# Patient Record
Sex: FEMALE | Race: WHITE | NOT HISPANIC OR LATINO | Employment: OTHER | URBAN - METROPOLITAN AREA
[De-identification: names, ages, dates, MRNs, and addresses within clinical notes are randomized per-mention and may not be internally consistent; named-entity substitution may affect disease eponyms.]

---

## 2019-12-12 ENCOUNTER — OFFICE VISIT (OUTPATIENT)
Dept: DERMATOLOGY | Facility: IMAGING CENTER | Age: 79
End: 2019-12-12
Payer: MEDICARE

## 2019-12-12 VITALS — WEIGHT: 134 LBS | BODY MASS INDEX: 23.74 KG/M2 | TEMPERATURE: 97 F | HEIGHT: 63 IN

## 2019-12-12 DIAGNOSIS — L81.4 LENTIGINES: ICD-10-CM

## 2019-12-12 DIAGNOSIS — L73.8 SEBACEOUS HYPERPLASIA: ICD-10-CM

## 2019-12-12 DIAGNOSIS — L82.1 SEBORRHEIC KERATOSES: ICD-10-CM

## 2019-12-12 DIAGNOSIS — L57.0 ACTINIC KERATOSES: ICD-10-CM

## 2019-12-12 DIAGNOSIS — D18.01 CHERRY ANGIOMA: ICD-10-CM

## 2019-12-12 DIAGNOSIS — Z12.83 SKIN CANCER SCREENING: ICD-10-CM

## 2019-12-12 DIAGNOSIS — R23.8 VENOUS LAKE OF LIP: ICD-10-CM

## 2019-12-12 PROCEDURE — 17003 DESTRUCT PREMALG LES 2-14: CPT | Performed by: DERMATOLOGY

## 2019-12-12 PROCEDURE — 99203 OFFICE O/P NEW LOW 30 MIN: CPT | Mod: 25 | Performed by: DERMATOLOGY

## 2019-12-12 PROCEDURE — 17000 DESTRUCT PREMALG LESION: CPT | Performed by: DERMATOLOGY

## 2019-12-12 RX ORDER — MECLIZINE HYDROCHLORIDE 25 MG/1
25 TABLET ORAL 3 TIMES DAILY PRN
COMMUNITY
End: 2021-03-10

## 2019-12-12 RX ORDER — METOPROLOL SUCCINATE 100 MG/1
100 TABLET, EXTENDED RELEASE ORAL EVERY MORNING
COMMUNITY

## 2019-12-12 RX ORDER — DULOXETIN HYDROCHLORIDE 60 MG/1
60 CAPSULE, DELAYED RELEASE ORAL EVERY MORNING
COMMUNITY

## 2019-12-12 NOTE — PROGRESS NOTES
DERMATOLOGY NOTE  NEW VISIT     12/12/19  Shy Barnett  1940  MRN: [id number]     Chief complaint: Establish Care       Shy Barnett is a 79 y.o. female who presents for evaluation of multiple skin lesions to check for skin cancer    HPI/location: lip two spots,   Time present: 5 + years  Painful lesion: Yes  Itching lesion: No  Enlarging lesion: No  Anything make it better or worse? no    History of skin cancer: No  History of precancers/actinic keratoses: No  History of biopsies:Yes, Details: moles removed   History of blistering/severe sunburns:Yes, Details: child  Family history of skin cancer:No  Family history of atypical moles:No        History reviewed. No pertinent past medical history.     History reviewed. No pertinent family history.     Social History     Socioeconomic History   • Marital status:      Spouse name: Not on file   • Number of children: Not on file   • Years of education: Not on file   • Highest education level: Not on file   Occupational History   • Not on file   Social Needs   • Financial resource strain: Not on file   • Food insecurity:     Worry: Not on file     Inability: Not on file   • Transportation needs:     Medical: Not on file     Non-medical: Not on file   Tobacco Use   • Smoking status: Never Smoker   • Smokeless tobacco: Never Used   Substance and Sexual Activity   • Alcohol use: Yes     Alcohol/week: 0.6 oz     Types: 1 Glasses of wine per week   • Drug use: Not on file   • Sexual activity: Not on file   Lifestyle   • Physical activity:     Days per week: Not on file     Minutes per session: Not on file   • Stress: Not on file   Relationships   • Social connections:     Talks on phone: Not on file     Gets together: Not on file     Attends Rastafari service: Not on file     Active member of club or organization: Not on file     Attends meetings of clubs or organizations: Not on file     Relationship status: Not on file   • Intimate partner violence:     Fear of current or  "ex partner: Not on file     Emotionally abused: Not on file     Physically abused: Not on file     Forced sexual activity: Not on file   Other Topics Concern   • Not on file   Social History Narrative   • Not on file        No Known Allergies     MEDICATIONS:  Medications relevant to specialty reviewed.    Current Outpatient Medications:   •  meclizine (ANTIVERT) 25 MG Tab, Take 25 mg by mouth 3 times a day as needed., Disp: , Rfl:   •  metoprolol SR (TOPROL XL) 25 MG TABLET SR 24 HR, Take 25 mg by mouth every day., Disp: , Rfl:   •  DULoxetine (CYMBALTA) 60 MG Cap DR Particles delayed-release capsule, Take 60 mg by mouth every day., Disp: , Rfl:      REVIEW OF SYSTEMS:   Positive for skin (see HPI)  Negative for fevers and chills  All other systems reviewed and are negative.    EXAM:  Temp 36.1 °C (97 °F)   Ht 1.6 m (5' 3\")   Wt 60.8 kg (134 lb)   BMI 23.74 kg/m²   Constitutional: Well-developed, well-nourished, and in no distress.   HENT:   Head: normocephalic and atraumatic.  Right Ear: External ear normal.   Left Ear: External ear normal.   Nose: Nose normal.   Mouth/Throat: Oropharynx is clear and moist.   Eyes: Conjunctivae and lids are normal.   Neck: Normal range of motion. Neck supple.   Pulmonary/Chest: Effort normal.   Neurological: Alert and oriented.    A total body skin exam was performed including the scalp, hair, face, eyelids, nose, lips, oral cavity, ears, neck, back, buttocks, chest, abdomen, bilateral upper and lower extremities including hands, feet, digits and nails.  The genital exam was deferred to her gynecologist.  Notable findings on exam today listed below. Remaining above-listed examined areas within normal limits / negative for rashes or lesions.     -Lower lip w/ 2 violaceous papules   -foreheadx4,  bilateral forearms x2 and chest x1 with pink gritty papules  -face with scattered yellow-white papules with telangiectasias and central dell  -sun exposed skin of trunk and b/l upper and " lower extremities with scattered clinically benign light brown reticulated macules all of which were morphologically similar and none of which were suspicious for skin cancer today on exam  -trunk and extremities with cherry red macules and papules  -trunk and extremities with scattered brown-grey, waxy, hyperkeratotic papules and plaques       IMPRESSION / PLAN:    1. Actinic keratoses  - Discussed precancerous nature of the condition and the relationship to past and current ultraviolet light exposure.  - Discussed need for safe sun practices and daily broad spectrum sun protection.   - Discussed treatment options including topical therapies, cryotherapy and photodynamic therapy.   - After discussing treatment options, patient wishes to pursue cryotherapy. Patient was advised that if actinic keratoses persist at one month after treatment, should come in for further evaluation and possible biopsy.     Procedure Note: Informed verbal consent including risk of redness, swelling, pain, blistering, scar, bleeding, infection and need for possible further treatment was obtained. The location(s) was/were identified by the patient and the physician. The lesion(s) was/were treated with cryotherapy for a total of 1 freeze/thaw cycles with the open spray technique. The patient tolerated the procedure well.  Wound care was discussed.   Location: as noted in exam  Total # of lesions treated:  7    2. Venous lake of lip  -nature of the diagnosis was discussed in detail  -clinically benign today on exam, reassurance was given  -continue to monitor for any changes, pt to call if any changes occur    3. Sebaceous hyperplasia  -nature of the diagnosis was discussed in detail  -clinically benign today on exam, reassurance was given  -continue to monitor for any changes, pt to call if any changes occur    4. Lentigines  - Discussed benign nature of lesions, related to sun exposure  - Discussed sun protection    5. Seborrheic  keratoses  -nature of the diagnosis was discussed in detail  -clinically benign today on exam, reassurance was given  -continue to monitor for any changes, pt to call if any changes occur    6. Cherry angioma  -nature of the diagnosis was discussed in detail  -clinically benign today on exam, reassurance was given  -continue to monitor for any changes, pt to call if any changes occur    7. Skin cancer screening  Skin cancer education  - discussed importance of sun protective clothing, eyewear  - discussed importance of daily use of broad spectrum sunscreen with SPF 30 or greater, as well as need for reapplication ~every 2 hours when exposed to UVR  - discussed importance of regular self-exams, ideally once per month, every 12 months exams in clinic  - ABCDE's of melanoma discussed  - patient to bring any new or concerning lesions to my attention     Return to clinic in: Return in about 1 year (around 12/12/2020) for lisa. and as needed for any new or changing skin lesions.    Shobha Thomas M.D.

## 2020-02-07 ENCOUNTER — HOSPITAL ENCOUNTER (OUTPATIENT)
Dept: LAB | Facility: MEDICAL CENTER | Age: 80
End: 2020-02-07
Attending: NURSE PRACTITIONER
Payer: MEDICARE

## 2020-02-07 PROCEDURE — 36415 COLL VENOUS BLD VENIPUNCTURE: CPT

## 2020-02-07 PROCEDURE — 86480 TB TEST CELL IMMUN MEASURE: CPT | Mod: GY

## 2020-02-10 LAB
GAMMA INTERFERON BACKGROUND BLD IA-ACNC: 0.05 IU/ML
M TB IFN-G BLD-IMP: NEGATIVE
M TB IFN-G CD4+ BCKGRND COR BLD-ACNC: 0 IU/ML
MITOGEN IGNF BCKGRD COR BLD-ACNC: >10 IU/ML
QFT TB2 - NIL TBQ2: 0 IU/ML

## 2020-03-02 ENCOUNTER — OFFICE VISIT (OUTPATIENT)
Dept: NEUROLOGY | Facility: MEDICAL CENTER | Age: 80
End: 2020-03-02
Payer: MEDICARE

## 2020-03-02 VITALS
TEMPERATURE: 97.7 F | HEIGHT: 63 IN | WEIGHT: 139.55 LBS | HEART RATE: 118 BPM | BODY MASS INDEX: 24.73 KG/M2 | DIASTOLIC BLOOD PRESSURE: 58 MMHG | SYSTOLIC BLOOD PRESSURE: 100 MMHG | RESPIRATION RATE: 14 BRPM | OXYGEN SATURATION: 96 %

## 2020-03-02 DIAGNOSIS — R51.9 CHRONIC INTRACTABLE HEADACHE, UNSPECIFIED HEADACHE TYPE: ICD-10-CM

## 2020-03-02 DIAGNOSIS — R41.3 MEMORY LOSS, SHORT TERM: ICD-10-CM

## 2020-03-02 DIAGNOSIS — R42 DIZZINESS AND GIDDINESS: ICD-10-CM

## 2020-03-02 DIAGNOSIS — G89.29 CHRONIC INTRACTABLE HEADACHE, UNSPECIFIED HEADACHE TYPE: ICD-10-CM

## 2020-03-02 PROCEDURE — 99205 OFFICE O/P NEW HI 60 MIN: CPT

## 2020-03-02 RX ORDER — BACLOFEN 20 MG
500 TABLET ORAL DAILY
Qty: 90 TAB | Refills: 3 | Status: SHIPPED | OUTPATIENT
Start: 2020-03-02 | End: 2021-05-24

## 2020-03-02 RX ORDER — POTASSIUM CHLORIDE 750 MG/1
10 TABLET, EXTENDED RELEASE ORAL 2 TIMES DAILY
COMMUNITY
Start: 2020-02-26

## 2020-03-02 RX ORDER — MEMANTINE HYDROCHLORIDE 5 MG/1
5 TABLET ORAL 2 TIMES DAILY
Qty: 90 TAB | Refills: 3 | Status: SHIPPED | OUTPATIENT
Start: 2020-03-02 | End: 2021-05-24

## 2020-03-02 RX ORDER — HYDROCHLOROTHIAZIDE 12.5 MG/1
12.5 TABLET ORAL DAILY
COMMUNITY
End: 2021-05-24

## 2020-03-02 ASSESSMENT — PATIENT HEALTH QUESTIONNAIRE - PHQ9: CLINICAL INTERPRETATION OF PHQ2 SCORE: 0

## 2020-03-03 NOTE — PROGRESS NOTES
CC:   Headaches and dizziness  States it comes and goes when she is stressed  Has to lay down for awhile   Memory loss      HPI:  One per week had a pounding headaches and was taking Fioricet daily for more than a year, recently was taken off Fioricet.  Now she takes OTC medications for her   Migraines in high school with visual aura and bright lights bother her gets nauseous and does not vomit.  < 15 tablets per month   Current headaches are 2-3 per week.  The dizziness comes together with the headaches and sometimes it is unrelated.  Feels nauseous do not throw up and lightheaded she also describes palpitations.  No ringing and position provokes it sometimes.  She used to live in [facility]   Dizziness sometimes comes once per month last event was within last month.  /58   Laying down and drinking water would help  Depression she is on cymbalta  She also has memory loss  She is now in assisted living facility and is there with her .  Sometimes has neck pain   Headaches 5/10takes aspirin and tylenol during the last week did not take any medications.  Drinking alcohol 1-2 glasses wine per day     FH  Dad had dementia   Mpm had migraines   Sister and brother are OK  Daughter has adrenal insufficiency     PCP is checking labs  No LOC   No strokes or TIA    100/58 blood pressure today     ROS:   Constitutional: No fevers or chills.  Eyes: No blurry vision or eye pain.  ENT: No dysphagia or hearing loss.  Respiratory: No cough or shortness of breath.  Cardiovascular: No chest pain or palpitations.  GI: No nausea, vomiting, or diarrhea.  : No urinary incontinence or dysuria.  Musculoskeletal: No joint swelling or arthralgias.  Skin: No skin rashes.  Neuro:as above   Endocrine: No heat or cold intolerance. No polydipsia or polyuria.  Psych: No depression or anxiety.  Heme/Lymph: No easy bruising or swollen lymph nodes.      Past Medical History:   No past medical history on file.  Depression   Anxiety    Hypertension   Migraines     Past Surgical History:   No past surgical history on file.    Social History:   Social History     Socioeconomic History   • Marital status:      Spouse name: Not on file   • Number of children: Not on file   • Years of education: Not on file   • Highest education level: Not on file   Occupational History   • Not on file   Social Needs   • Financial resource strain: Not on file   • Food insecurity     Worry: Not on file     Inability: Not on file   • Transportation needs     Medical: Not on file     Non-medical: Not on file   Tobacco Use   • Smoking status: Never Smoker   • Smokeless tobacco: Never Used   Substance and Sexual Activity   • Alcohol use: Yes     Alcohol/week: 0.6 oz     Types: 1 Glasses of wine per week   • Drug use: Not on file   • Sexual activity: Not on file   Lifestyle   • Physical activity     Days per week: Not on file     Minutes per session: Not on file   • Stress: Not on file   Relationships   • Social connections     Talks on phone: Not on file     Gets together: Not on file     Attends Rastafari service: Not on file     Active member of club or organization: Not on file     Attends meetings of clubs or organizations: Not on file     Relationship status: Not on file   • Intimate partner violence     Fear of current or ex partner: Not on file     Emotionally abused: Not on file     Physically abused: Not on file     Forced sexual activity: Not on file   Other Topics Concern   • Not on file   Social History Narrative   • Not on file     Family History:  No family history on file.    Allergies:   No Known Allergies    Current Outpatient Medications on File Prior to Visit   Medication Sig Dispense Refill   • potassium chloride SA (K-DUR) 10 MEQ Tab CR      • hydroCHLOROthiazide (HYDRODIURIL) 25 MG Tab Take 25 mg by mouth as needed.     • metoprolol SR (TOPROL XL) 25 MG TABLET SR 24 HR Take 25 mg by mouth every day.     • meclizine (ANTIVERT) 25 MG Tab Take 25  "mg by mouth 3 times a day as needed.     • DULoxetine (CYMBALTA) 60 MG Cap DR Particles delayed-release capsule Take 60 mg by mouth every day.       No current facility-administered medications on file prior to visit.      Physical Exam:     Encounter Vitals  Standard Vitals  Vitals  Blood Pressure : 100/58  Temperature: 36.5 °C (97.7 °F)  Temp src: Temporal  Pulse: (!) 118  Respiration: 14  Pulse Oximetry: 96 %  Height: 160 cm (5' 3\")  Weight: 63.3 kg (139 lb 8.8 oz)  Encounter Vitals  Temperature: 36.5 °C (97.7 °F)  Temp src: Temporal  Blood Pressure : 100/58  Pulse: (!) 118  Respiration: 14  Pulse Oximetry: 96 %  Weight: 63.3 kg (139 lb 8.8 oz)  Height: 160 cm (5' 3\")  BMI (Calculated): 24.72  Pulmonary-Specific Vitals     Durable Medical Equipment-Specific Vitals         Constitutional: Well-developed, well-nourished, good hygiene. Appears stated age.  Cardiovascular: RRR, with no murmurs, rubs or gallops. No carotid bruits.   Respiratory: Lungs CTA B/L, no W/R/R.   Abdomen: Soft Non-tender to Palpation. Non-distended.  Extremities: No peripheral edema, pedal pulses intact.   Skin: Warm, dry, intact. No rashes observed.  Eyes: Sclera anicteric   Funduscopic: Optic discs flat with no evidence of papilledema or pallor.   Neurologic:   Mental Status: Awake, alert, oriented x 3.   Speech: Fluent with normal prosody.   Memory: Able to recall recent and remote events accurately.    Concentration: Attentive. Able to focus on history and follow multi-step commands.              Fund of Knowledge: Appropriate   Cranial Nerves:    CN II: PERRL. No afferent pupillary defect.    CN III, IV, VI: Good eye closure, EOMI.     CN V: Facial sensation intact and symmetric.     CN VII: No facial asymmetry.     CN VIII: Hearing intact.     CN IX and X: Palate elevates symmetrically. Normal gag reflex.    CN XI: Symmetric shoulder shrug.     CN XII: Tongue midline.    Sensory: Intact light touch, vibration and temperature. "    Coordination: No evidence of past-pointing on finger to nose testing, no dysdiadochokinesia. Heel to shin intact.             DTR's: 2+ throughout without clonus.    Babinski: Toes downgoing bilaterally.   Romberg: Negative.   Movements: No tremors observed.   Musculoskeletal:    Strength: 5/5 in upper and lower extremities bilaterally.   Gait: Steady, narrow based.    Tone: Normal bulk and tone.   Joints: No swelling.   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 Folate and tsh labs   memantine 5 mg bid     Headaches, tension type  Neck pain    Mag oxide 500 mg daily   B2 400 mg daily   Coq10 300 mg daily  Swimming  Yoga  No driving until the workup is done    1. Proper Diet: We recommend the Mediterranean diet   2. Proper Vascular Health: Making sure that your primary care provider (PCP) is screening for and treating all vascular risk factors (diabetes, high cholesterol, high blood pressure, and such)  3. Quit smoking, if you smoke   4. Exercise as tolerated with a goal of at least 30 minutes 5 days a week or a total of 150 minutes per week  5. Focus on what you enjoy  6. Try learning new hobbies or skills, even if you’re not great at them  7. Regular social interaction: Maintain an active social life as much as possible   8. Keep your brain active with cognitively stimulating activities such as brain games  9. Get 7-8 hours of sleep per night  10. Maintain a predictable daily routine  [You may visit  www.healthybrains.org for more information]    lifestyle changes that will help to reduce headache frequency and intensity   · Diet: Eat at regular times each day. Eat protein throughout the day. It is especially important to start the day with protein.  · Sleep: Keep regular hours for going to bed at night and getting up in the morning. Do not vary the hours on the weekend. Do not watch TV and read in bed. If you are unable to sleep, get up and read something boring until you feel sleepy, then go back to bed. If you are not able to sleep within 20 minutes, get up again. Even if you do not sleep well, get out of bed at the same time in the morning. Do not take naps. This will help you to regulate your sleep, and improve your sleep quality.  · Exercise: The goal is to exercise at the same time every day for at least 20 minutes at an intensity that increases your heart rate.    3. Headache diary- bhavya on a calendar the days you have a headache, severity of headache, medication taken, and how long headache lasted    RTC after above

## 2020-03-05 ENCOUNTER — TELEPHONE (OUTPATIENT)
Dept: NEUROLOGY | Facility: MEDICAL CENTER | Age: 80
End: 2020-03-05

## 2020-03-24 DIAGNOSIS — R42 DIZZINESS AND GIDDINESS: ICD-10-CM

## 2020-03-24 DIAGNOSIS — G89.29 CHRONIC INTRACTABLE HEADACHE, UNSPECIFIED HEADACHE TYPE: ICD-10-CM

## 2020-03-24 DIAGNOSIS — R51.9 CHRONIC INTRACTABLE HEADACHE, UNSPECIFIED HEADACHE TYPE: ICD-10-CM

## 2020-04-29 ENCOUNTER — HOSPITAL ENCOUNTER (OUTPATIENT)
Dept: RADIOLOGY | Facility: MEDICAL CENTER | Age: 80
End: 2020-04-29
Attending: INTERNAL MEDICINE
Payer: MEDICARE

## 2020-04-29 DIAGNOSIS — R42 DIZZINESS AND GIDDINESS: ICD-10-CM

## 2020-04-29 DIAGNOSIS — G43.501: ICD-10-CM

## 2020-04-29 DIAGNOSIS — R41.89 COGNITIVE IMPAIRMENT: ICD-10-CM

## 2020-04-29 PROCEDURE — 70450 CT HEAD/BRAIN W/O DYE: CPT

## 2020-06-08 DIAGNOSIS — G30.9 ALZHEIMER'S DISEASE, UNSPECIFIED (CODE) (HCC): ICD-10-CM

## 2020-06-24 ENCOUNTER — HOSPITAL ENCOUNTER (OUTPATIENT)
Dept: RADIOLOGY | Facility: MEDICAL CENTER | Age: 80
End: 2020-06-24
Payer: MEDICARE

## 2020-06-24 DIAGNOSIS — G30.9 ALZHEIMER'S DISEASE, UNSPECIFIED (CODE) (HCC): ICD-10-CM

## 2020-06-24 PROCEDURE — A9552 F18 FDG: HCPCS

## 2020-06-29 ENCOUNTER — TELEPHONE (OUTPATIENT)
Dept: NEUROLOGY | Facility: MEDICAL CENTER | Age: 80
End: 2020-06-29

## 2020-07-27 ENCOUNTER — TELEPHONE (OUTPATIENT)
Dept: NEUROLOGY | Facility: MEDICAL CENTER | Age: 80
End: 2020-07-27

## 2021-01-16 DIAGNOSIS — Z23 NEED FOR VACCINATION: ICD-10-CM

## 2021-03-10 ENCOUNTER — APPOINTMENT (OUTPATIENT)
Dept: RADIOLOGY | Facility: MEDICAL CENTER | Age: 81
End: 2021-03-10
Attending: EMERGENCY MEDICINE
Payer: MEDICARE

## 2021-03-10 ENCOUNTER — HOSPITAL ENCOUNTER (EMERGENCY)
Facility: MEDICAL CENTER | Age: 81
End: 2021-03-10
Attending: EMERGENCY MEDICINE
Payer: MEDICARE

## 2021-03-10 VITALS
BODY MASS INDEX: 24.8 KG/M2 | HEIGHT: 63 IN | OXYGEN SATURATION: 98 % | WEIGHT: 140 LBS | TEMPERATURE: 97 F | SYSTOLIC BLOOD PRESSURE: 149 MMHG | RESPIRATION RATE: 16 BRPM | DIASTOLIC BLOOD PRESSURE: 61 MMHG | HEART RATE: 56 BPM

## 2021-03-10 DIAGNOSIS — S01.81XA FACIAL LACERATION, INITIAL ENCOUNTER: ICD-10-CM

## 2021-03-10 DIAGNOSIS — S09.90XA CLOSED HEAD INJURY, INITIAL ENCOUNTER: ICD-10-CM

## 2021-03-10 LAB
ALBUMIN SERPL BCP-MCNC: 4 G/DL (ref 3.2–4.9)
ALBUMIN/GLOB SERPL: 1.5 G/DL
ALP SERPL-CCNC: 86 U/L (ref 30–99)
ALT SERPL-CCNC: 14 U/L (ref 2–50)
ANION GAP SERPL CALC-SCNC: 11 MMOL/L (ref 7–16)
APTT PPP: 29.8 SEC (ref 24.7–36)
AST SERPL-CCNC: 19 U/L (ref 12–45)
BASOPHILS # BLD AUTO: 0.8 % (ref 0–1.8)
BASOPHILS # BLD: 0.05 K/UL (ref 0–0.12)
BILIRUB SERPL-MCNC: 0.7 MG/DL (ref 0.1–1.5)
BUN SERPL-MCNC: 14 MG/DL (ref 8–22)
CALCIUM SERPL-MCNC: 9.5 MG/DL (ref 8.5–10.5)
CHLORIDE SERPL-SCNC: 102 MMOL/L (ref 96–112)
CO2 SERPL-SCNC: 24 MMOL/L (ref 20–33)
CREAT SERPL-MCNC: 0.61 MG/DL (ref 0.5–1.4)
EOSINOPHIL # BLD AUTO: 0.1 K/UL (ref 0–0.51)
EOSINOPHIL NFR BLD: 1.6 % (ref 0–6.9)
ERYTHROCYTE [DISTWIDTH] IN BLOOD BY AUTOMATED COUNT: 50.4 FL (ref 35.9–50)
GLOBULIN SER CALC-MCNC: 2.7 G/DL (ref 1.9–3.5)
GLUCOSE SERPL-MCNC: 90 MG/DL (ref 65–99)
HCT VFR BLD AUTO: 44 % (ref 37–47)
HGB BLD-MCNC: 14.3 G/DL (ref 12–16)
IMM GRANULOCYTES # BLD AUTO: 0.02 K/UL (ref 0–0.11)
IMM GRANULOCYTES NFR BLD AUTO: 0.3 % (ref 0–0.9)
INR PPP: 1.03 (ref 0.87–1.13)
LYMPHOCYTES # BLD AUTO: 1.65 K/UL (ref 1–4.8)
LYMPHOCYTES NFR BLD: 26.4 % (ref 22–41)
MCH RBC QN AUTO: 33.7 PG (ref 27–33)
MCHC RBC AUTO-ENTMCNC: 32.5 G/DL (ref 33.6–35)
MCV RBC AUTO: 103.8 FL (ref 81.4–97.8)
MONOCYTES # BLD AUTO: 0.51 K/UL (ref 0–0.85)
MONOCYTES NFR BLD AUTO: 8.2 % (ref 0–13.4)
NEUTROPHILS # BLD AUTO: 3.91 K/UL (ref 2–7.15)
NEUTROPHILS NFR BLD: 62.7 % (ref 44–72)
NRBC # BLD AUTO: 0 K/UL
NRBC BLD-RTO: 0 /100 WBC
PLATELET # BLD AUTO: 277 K/UL (ref 164–446)
PMV BLD AUTO: 9.2 FL (ref 9–12.9)
POTASSIUM SERPL-SCNC: 3.6 MMOL/L (ref 3.6–5.5)
PROT SERPL-MCNC: 6.7 G/DL (ref 6–8.2)
PROTHROMBIN TIME: 13.8 SEC (ref 12–14.6)
RBC # BLD AUTO: 4.24 M/UL (ref 4.2–5.4)
SODIUM SERPL-SCNC: 137 MMOL/L (ref 135–145)
WBC # BLD AUTO: 6.2 K/UL (ref 4.8–10.8)

## 2021-03-10 PROCEDURE — 99284 EMERGENCY DEPT VISIT MOD MDM: CPT

## 2021-03-10 PROCEDURE — 304217 HCHG IRRIGATION SYSTEM

## 2021-03-10 PROCEDURE — 304999 HCHG REPAIR-SIMPLE/INTERMED LEVEL 1

## 2021-03-10 PROCEDURE — 700101 HCHG RX REV CODE 250: Performed by: EMERGENCY MEDICINE

## 2021-03-10 PROCEDURE — 85025 COMPLETE CBC W/AUTO DIFF WBC: CPT

## 2021-03-10 PROCEDURE — 80053 COMPREHEN METABOLIC PANEL: CPT

## 2021-03-10 PROCEDURE — 303747 HCHG EXTRA SUTURE

## 2021-03-10 PROCEDURE — 70450 CT HEAD/BRAIN W/O DYE: CPT | Mod: ME

## 2021-03-10 PROCEDURE — 85730 THROMBOPLASTIN TIME PARTIAL: CPT

## 2021-03-10 PROCEDURE — 85610 PROTHROMBIN TIME: CPT

## 2021-03-10 RX ORDER — BUTALBITAL, ACETAMINOPHEN AND CAFFEINE 50; 325; 40 MG/1; MG/1; MG/1
1 TABLET ORAL EVERY 4 HOURS PRN
COMMUNITY
End: 2021-06-13

## 2021-03-10 RX ORDER — ERGOCALCIFEROL 1.25 MG/1
50000 CAPSULE ORAL
COMMUNITY
End: 2021-05-24

## 2021-03-10 RX ORDER — LIDOCAINE HYDROCHLORIDE 20 MG/ML
10 INJECTION, SOLUTION INFILTRATION; PERINEURAL ONCE
Status: COMPLETED | OUTPATIENT
Start: 2021-03-10 | End: 2021-03-10

## 2021-03-10 RX ORDER — CETIRIZINE HYDROCHLORIDE 10 MG/1
10 TABLET ORAL EVERY MORNING
COMMUNITY

## 2021-03-10 RX ORDER — SENNOSIDES 8.6 MG
650 CAPSULE ORAL 2 TIMES DAILY
COMMUNITY

## 2021-03-10 RX ADMIN — LIDOCAINE HYDROCHLORIDE 10 ML: 20 INJECTION, SOLUTION INFILTRATION; PERINEURAL at 12:45

## 2021-03-10 NOTE — DISCHARGE PLANNING
Anticipated Discharge Disposition:   [facility].     Action:   Received a call from RN stating that pt is ready to return home.   CM confirmed with Sadaf at [facility] that pt is able to return to their facility. Address confirmed. CM met pt and she is alert and pleasant and confirmed that she can ambulate with a walker with one person assistance    Transportation form completed and faxed to [facility].    Barriers to Discharge:   Pending transportation arrangements.     Plan:   Dc to [facility]

## 2021-03-10 NOTE — ED NOTES
Pharmacy Medication Reconciliation      Medication reconciliation updated and complete per [facility]  Allergies have been verified   No oral ABX within the last 14 days

## 2021-03-10 NOTE — ED PROVIDER NOTES
ED Provider Note    CHIEF COMPLAINT  Chief Complaint   Patient presents with   • T-5000 GLF     TBI ALERT - fall out of bed this morning, struck forehead on nightstand       HPI  Shy Saurabh Banrett is a 80 y.o. female who presents for evaluation of head injury.  The patient was brought in by ambulance from a senior living facility.  She apparently slipped getting out of bed and struck her head on a nightstand.  Potential brief loss of consciousness.  No other injuries to the upper or lower extremities chest abdomen pelvis neck or back.  She does not take anticoagulants other than aspirin.  No associated focal numbness weakness tingling to the arms legs or face.  She denies any double vision.  Paramedics report that she was slightly confused.  She did sustain a 2 cm deep laceration to the forehead.  Injury occurred 30 minutes prior to arrival    REVIEW OF SYSTEMS  See HPI for further details.  No high fevers chills night sweats weight loss all other systems are negative.     PAST MEDICAL HISTORY  No past medical history on file.  Dementia hypertension  FAMILY HISTORY  Noncontributory    SOCIAL HISTORY  Social History     Socioeconomic History   • Marital status:      Spouse name: Not on file   • Number of children: Not on file   • Years of education: Not on file   • Highest education level: Not on file   Occupational History   • Not on file   Tobacco Use   • Smoking status: Never Smoker   • Smokeless tobacco: Never Used   Substance and Sexual Activity   • Alcohol use: Yes     Alcohol/week: 0.6 oz     Types: 1 Glasses of wine per week   • Drug use: Not on file   • Sexual activity: Not on file   Other Topics Concern   • Not on file   Social History Narrative   • Not on file     Social Determinants of Health     Financial Resource Strain:    • Difficulty of Paying Living Expenses:    Food Insecurity:    • Worried About Running Out of Food in the Last Year:    • Ran Out of Food in the Last Year:    Transportation  "Needs:    • Lack of Transportation (Medical):    • Lack of Transportation (Non-Medical):    Physical Activity:    • Days of Exercise per Week:    • Minutes of Exercise per Session:    Stress:    • Feeling of Stress :    Social Connections:    • Frequency of Communication with Friends and Family:    • Frequency of Social Gatherings with Friends and Family:    • Attends [employer] Services:    • Active Member of Clubs or Organizations:    • Attends Club or Organization Meetings:    • Marital Status:    Intimate Partner Violence:    • Fear of Current or Ex-Partner:    • Emotionally Abused:    • Physically Abused:    • Sexually Abused:      Denies IV drugs  SURGICAL HISTORY  No past surgical history on file.    CURRENT MEDICATIONS  Home Medications    **Home medications have not yet been reviewed for this encounter**         ALLERGIES  No Known Allergies    PHYSICAL EXAM  VITAL SIGNS: BP (!) 162/69   Pulse 60   Temp 36.8 °C (98.2 °F) (Temporal)   Resp 16   Ht 1.6 m (5' 3\")   Wt 63.5 kg (140 lb)   SpO2 96%   BMI 24.80 kg/m²       Constitutional: Well developed, Well nourished, No acute distress, Non-toxic appearance.   HENT: No hemotympanum negative varela sign 2.5 cm laceration on the right aspect forehead, Bilateral external ears normal, Oropharynx moist, No oral exudates, Nose normal.   Eyes: PERRLA, pupils 4 to 2 mm equal symmetric bilaterally EOMI, Conjunctiva normal, No discharge.   Neck: No midline bony tenderness  Cardiovascular: Normal heart rate, Normal rhythm, No murmurs, No rubs, No gallops.   Thorax & Lungs: Normal breath sounds, No respiratory distress, No wheezing, No chest tenderness.   Abdomen: Bowel sounds normal, Soft, No tenderness, No masses, No pulsatile masses.   Skin: Warm, Dry, No erythema, No rash.   Back: No line bony tenderness tenderness, No CVA tenderness.   Extremities: Intact distal pulses, No edema, No tenderness, No cyanosis, No clubbing.   Neurologic: Alert & oriented x 3, Normal " motor function, Normal sensory function, No focal deficits noted.   Psychiatric: Affect normal, Judgment normal, Mood normal.       RADIOLOGY/PROCEDURES  CT-HEAD W/O   Final Result      1.  Diffuse atrophy and white matter changes.   2.  No acute intracranial hemorrhage or territorial infarct.           Results for orders placed or performed during the hospital encounter of 03/10/21   CBC WITH DIFFERENTIAL   Result Value Ref Range    WBC 6.2 4.8 - 10.8 K/uL    RBC 4.24 4.20 - 5.40 M/uL    Hemoglobin 14.3 12.0 - 16.0 g/dL    Hematocrit 44.0 37.0 - 47.0 %    .8 (H) 81.4 - 97.8 fL    MCH 33.7 (H) 27.0 - 33.0 pg    MCHC 32.5 (L) 33.6 - 35.0 g/dL    RDW 50.4 (H) 35.9 - 50.0 fL    Platelet Count 277 164 - 446 K/uL    MPV 9.2 9.0 - 12.9 fL    Neutrophils-Polys 62.70 44.00 - 72.00 %    Lymphocytes 26.40 22.00 - 41.00 %    Monocytes 8.20 0.00 - 13.40 %    Eosinophils 1.60 0.00 - 6.90 %    Basophils 0.80 0.00 - 1.80 %    Immature Granulocytes 0.30 0.00 - 0.90 %    Nucleated RBC 0.00 /100 WBC    Neutrophils (Absolute) 3.91 2.00 - 7.15 K/uL    Lymphs (Absolute) 1.65 1.00 - 4.80 K/uL    Monos (Absolute) 0.51 0.00 - 0.85 K/uL    Eos (Absolute) 0.10 0.00 - 0.51 K/uL    Baso (Absolute) 0.05 0.00 - 0.12 K/uL    Immature Granulocytes (abs) 0.02 0.00 - 0.11 K/uL    NRBC (Absolute) 0.00 K/uL   Comp Metabolic Panel   Result Value Ref Range    Sodium 137 135 - 145 mmol/L    Potassium 3.6 3.6 - 5.5 mmol/L    Chloride 102 96 - 112 mmol/L    Co2 24 20 - 33 mmol/L    Anion Gap 11.0 7.0 - 16.0    Glucose 90 65 - 99 mg/dL    Bun 14 8 - 22 mg/dL    Creatinine 0.61 0.50 - 1.40 mg/dL    Calcium 9.5 8.5 - 10.5 mg/dL    AST(SGOT) 19 12 - 45 U/L    ALT(SGPT) 14 2 - 50 U/L    Alkaline Phosphatase 86 30 - 99 U/L    Total Bilirubin 0.7 0.1 - 1.5 mg/dL    Albumin 4.0 3.2 - 4.9 g/dL    Total Protein 6.7 6.0 - 8.2 g/dL    Globulin 2.7 1.9 - 3.5 g/dL    A-G Ratio 1.5 g/dL   Prothrombin Time   Result Value Ref Range    PT 13.8 12.0 - 14.6 sec    INR  1.03 0.87 - 1.13   APTT   Result Value Ref Range    APTT 29.8 24.7 - 36.0 sec   ESTIMATED GFR   Result Value Ref Range    GFR If African American >60 >60 mL/min/1.73 m 2    GFR If Non African American >60 >60 mL/min/1.73 m 2           COURSE & MEDICAL DECISION MAKING  Pertinent Labs & Imaging studies reviewed. (See chart for details)  Physician procedure: 2.5 cm facial laceration.  The wound was anesthetized with a total of 5 cc of 2% lidocaine without epinephrine.  The wound was copiously irrigated with 200 cc of sterile saline.  Sterile prep and drape.  No exposed calvarium or foreign body.  A total of 5, six-point 0 interrupted nylon sutures were placed no complications    Patient presents here with a mechanical ground-level fall with head injury.  She was a code TBI from triage.  CT scan of the head is normal.  She did not have any suggestion of any other injuries.  Laboratory studies are reassuring.  She was steady on her feet and her wound was primarily closed.  Suture removal in 7 days    FINAL IMPRESSION  1.   1. Closed head injury, initial encounter     2. Facial laceration, initial encounter           Electronically signed by: Jean-Claude Figueroa M.D., 3/10/2021 11:33 AM

## 2021-03-10 NOTE — DISCHARGE PLANNING
Received Transport Form @ 5010  Spoke to Jules @ [employer] [facility]    Transport is scheduled @ 3/10 in about 35 mins going to [facility]. Notified PAULINE Smallwood.

## 2021-03-10 NOTE — ED NOTES
"June Saurabh Barnett  Chief Complaint   Patient presents with   • T-5000 GLF     TBI ALERT - fall out of bed this morning, struck forehead on nightstand     Pt BIB [facility] from [facility]. Pt had fall this morning when getting out of bed, struck head on nightstand. Pt has small laceration to forehead, bleeding controlled. Pt has had multiple falls over the last few weeks. -thinners. -LOC. Baseline GCS 14. Disoriented to date/time.     Pt to CT scan, then jaun 15. Report given to PAULINE Otero and Cale RN.    BP (!) 162/69   Pulse 60   Temp 36.8 °C (98.2 °F) (Temporal)   Resp 16   Ht 1.6 m (5' 3\")   Wt 63.5 kg (140 lb)   SpO2 96%   "

## 2021-04-14 NOTE — DOCUMENTATION QUERY
[facility]                                                                       Query Response Note      PATIENT:               ROGER ARDON  ACCT #:                  [id number]  MRN:                     [id number]  :                      1940  ADMIT DATE:       3/10/2021 10:56 AM  DISCH DATE:        3/10/2021 2:37 PM  RESPONDING  PROVIDER #:        [id number]           QUERY TEXT:    Your assistance is needed in determining the reason  for Lab PT/PTT that was ordered.  This service is non-covered by the diagnoses documented in the medical record.      Can you please provide an additional diagnosis that describes the sign or symptom that  (prompted/initiated) the Lab PT/PTT.    NOTE:  If an appropriate answer is not listed below, please respond with a new note.        The patient's Clinical Indicators include:  Lab PT/PTT  Options provided:   -- Other related diagnosis, Please specify diagnosis demonstrating medical necessity for lab/imaging/other test.)   -- Unable to determine      Query created by: Lexi Dean on 3/19/2021 8:19 AM    RESPONSE TEXT:    Unable to determine          Electronically signed by:  SHANTEL BARRON MD 2021 2:47 PM

## 2021-06-13 ENCOUNTER — HOSPITAL ENCOUNTER (EMERGENCY)
Facility: MEDICAL CENTER | Age: 81
End: 2021-06-13
Attending: EMERGENCY MEDICINE
Payer: MEDICARE

## 2021-06-13 ENCOUNTER — APPOINTMENT (OUTPATIENT)
Dept: RADIOLOGY | Facility: MEDICAL CENTER | Age: 81
End: 2021-06-13
Attending: EMERGENCY MEDICINE
Payer: MEDICARE

## 2021-06-13 VITALS
HEIGHT: 63 IN | RESPIRATION RATE: 16 BRPM | HEART RATE: 78 BPM | TEMPERATURE: 98 F | WEIGHT: 135 LBS | DIASTOLIC BLOOD PRESSURE: 86 MMHG | SYSTOLIC BLOOD PRESSURE: 148 MMHG | BODY MASS INDEX: 23.92 KG/M2 | OXYGEN SATURATION: 93 %
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
## 2021-06-13 DIAGNOSIS — R29.6 FREQUENT FALLS: ICD-10-CM

## 2021-06-13 PROCEDURE — 70450 CT HEAD/BRAIN W/O DYE: CPT | Mod: ME

## 2021-06-13 PROCEDURE — 99284 EMERGENCY DEPT VISIT MOD MDM: CPT | Mod: 25

## 2021-06-13 PROCEDURE — 72125 CT NECK SPINE W/O DYE: CPT | Mod: ME

## 2021-06-13 RX ORDER — ERGOCALCIFEROL 1.25 MG/1
50000 CAPSULE ORAL
COMMUNITY

## 2021-06-13 ASSESSMENT — FIBROSIS 4 INDEX: FIB4 SCORE: 1.45

## 2021-06-14 NOTE — ED NOTES
Pt discharged. Pt provided information about falls. Pt educated to follow-up w/ PCP and to return to the ER w/ any worsening symptoms. Report given to THELMASA. Pt transferred back to [facility] w/ THELMASA.

## 2021-06-14 NOTE — DISCHARGE PLANNING
Medical Social Work    MSW received a call from bedside RN that pt is ready to return to [facility].  Pt will need [facility] transport due to dementia diagnosis.  MSW contacted Janneth at [facility] who will accept pt back; room 105.  MSW faxed PCS and facesheet to [facility] and made follow up phone call to L[facility] to arrange transport for 2145.  Bedside RN and [facility] aware of transport time.

## 2021-06-14 NOTE — ED PROVIDER NOTES
ED Provider Note    ED Provider Note    Primary care provider: POLLY No  Means of arrival: EMS  History obtained from: Patient    CHIEF COMPLAINT  Chief Complaint   Patient presents with   • ETOH Intoxication   • GLF     Pt had GLF x2 related to ETOH intoxication, fell onto knees both times, abrasions noted to bilateral needs, did not hit head, -blood thinners     Seen at 7:45 PM.   HPI  Shy Barnett is a 80 y.o. female who presents to the Emergency Department for evaluation after a fall.  Per EMS the patient fell forward and abraded her left knee.  Apparently she fell twice today.  She does drink a bottle of wine daily.  The patient wanted to refuse EMS transportation but the daughter is the power of , they contacted her and the daughter wanted her evaluated.  The patient is alert and oriented times person, place and situation.  She does know that her birthday is tomorrow and she plans on celebrating it with friends.  She does not wish to be in the emergency department and she would like to refuse any further treatment.    The patient does have a slight amount of ecchymosis on the right eyebrow, she states that this has been there for a while, she does not recall recently hitting her head.  She denies any numbness or weakness.    REVIEW OF SYSTEMS  See HPI,   Remainder of ROS negative.     PAST MEDICAL HISTORY   Denies    SURGICAL HISTORY  patient denies any surgical history    SOCIAL HISTORY  Social History     Tobacco Use   • Smoking status: Never Smoker   • Smokeless tobacco: Never Used   Vaping Use   • Vaping Use: Never used   Substance Use Topics   • Alcohol use: Yes     Alcohol/week: 0.6 oz     Types: 1 Glasses of wine per week     Comment: 1 bottle of wine a day   • Drug use: Not on file      Social History     Substance and Sexual Activity   Drug Use Not on file       FAMILY HISTORY  History reviewed. No pertinent family history.    CURRENT MEDICATIONS  Reviewed.  See Encounter  "Summary.     ALLERGIES  No Known Allergies    PHYSICAL EXAM  VITAL SIGNS: /91   Pulse 86   Temp 36.6 °C (97.9 °F) (Temporal)   Resp 16   Ht 1.6 m (5' 3\")   Wt 61.2 kg (135 lb)   SpO2 91%   BMI 23.91 kg/m²   Constitutional: Awake, alert in no apparent distress.  HENT: Normocephalic, Bilateral external ears normal. Nose normal.  Faint ecchymosis to the right lateral eyebrow, no lacerations.  No raccoon eyes or varela signs, no hemotympanum.  There is some left-sided neck tenderness.  The patient states her neck has been sore for weeks.  She has full range of motion.  Eyes: Conjunctiva normal, non-icteric, EOMI.    Thorax & Lungs: Easy unlabored respirations, Clear to ascultation bilaterally.  Cardiovascular: Regular rate, Regular rhythm, No murmurs, rubs or gallops. Bilateral pulses symmetrical.   Abdomen:  Soft, nontender, nondistended, normal active bowel sounds.   :    Skin: Visualized skin is  Dry, No erythema, No rash.   Musculoskeletal:   No cyanosis, clubbing or edema. No leg asymmetry.  Abrasion to the left anterior knee.  All long bones are palpated and found to be free of trauma.  The patient is able to ambulate with minimal assistance.  Neurologic: Alert, Grossly non-focal.   Psychiatric: Normal affect, Normal mood  Lymphatic:  No cervical LAD         RADIOLOGY  CT-HEAD W/O   Final Result         1.  No acute intracranial abnormality is identified, there are nonspecific white matter changes, commonly associated with small vessel ischemic disease.  Associated mild cerebral atrophy is noted.   2.  Atherosclerosis.      CT-CSPINE WITHOUT PLUS RECONS   Final Result         1.  Multilevel degenerative changes of the cervical spine limit diagnostic sensitivity of this examination   2.  Mild anterolisthesis C3 on C4, appears most likely secondary to severe facet arthrosis, traumatic listhesis could have similar radiographic appearance, otherwise no acute traumatic bony injury of the cervical spine " is apparent.            COURSE & MEDICAL DECISION MAKING  Pertinent Labs & Imaging studies reviewed. (See chart for details)    Differential diagnoses include but are not limited to: Subdural, C-spine injury, contusions    7:45 PM - Medical record reviewed, patient with a history of daily alcohol use, last seen in our facility in March of this year after a fall.  History of dementia.    7:55 PM: Overall the patient is well-appearing.  There does appear to be some recent, subacute trauma to the right forehead.  Given that she falls fairly often and drinks a bottle of wine daily I think it is reasonable to ensure that she does not have a small subdural prior to discharge.  Plan to CT the head and cervical spine, no labs are indicated based on today's presentation.  Will escalate work-up if radiology shows a significant injury.    Decision Making:  This is a pleasant 80 y.o. year old female who presents after a fall.  The patient denies striking her head.  There was a subtle area of ecchymosis of the right eyebrow that appears several days old.  The patient is neurologically intact and fully ambulatory.  After discussion she was willing to wait for CT head and C-spine.  No acute fracture was identified, there is some mild retrolisthesis.  Do not suspect ligamentous injury, the patient again has full range of motion of the neck without any numbness or focal weakness.  She very much would like to be discharged which I think is reasonable.    Discharge Medications:  New Prescriptions    No medications on file       The patient was discharged home (see d/c instructions) was told to return immediately for any signs or symptoms listed, or any worsening at all.  The patient verbally agreed to the discharge precautions and follow-up plan which is documented in EPIC.        FINAL IMPRESSION  1. Fall, initial encounter    2. Frequent falls

## 2021-06-14 NOTE — ED NOTES
Received report on patient. Pt ambulated to restroom w/ unsteady gait and one assistance. ERP at bedside

## 2021-06-14 NOTE — ED TRIAGE NOTES
Chief Complaint   Patient presents with   • ETOH Intoxication   • GLF     Pt had GLF x2 related to ETOH intoxication, fell onto knees both times, abrasions noted to bilateral needs, did not hit head, -blood thinners     Pt BIBA from [facility] living for above complaint. Pt drinks a bottle of wine daily. Pt had two falls onto knees today and daughter (POA) wanted pt to be seen. Pt ambulatory with assistance, denies pain or any other complaints. Pt has history of dementia.

## 2021-06-14 NOTE — ED NOTES
Pt ambulated to restroom w/ unsteady gait and handheld assist. Pt agitated and wanting to go home. Educated pt on need for head CT and setting up the ride back to her facility. Pt verbalized understanding. Pt to CT.

## 2021-07-03 PROBLEM — E87.6 HYPOKALEMIA: Chronic | Status: ACTIVE | Noted: 2021-05-24

## 2021-07-03 PROBLEM — R41.89 COGNITIVE IMPAIRMENT: Status: ACTIVE | Noted: 2021-05-24

## 2021-07-03 PROBLEM — I50.9 HEART FAILURE, UNSPECIFIED (HCC): Status: ACTIVE | Noted: 2021-05-24

## 2021-07-03 PROBLEM — I47.9 PAROXYSMAL TACHYCARDIA (HCC): Status: ACTIVE | Noted: 2021-05-24

## 2021-07-03 PROBLEM — F10.24 ALCOHOL DEPENDENCE WITH ALCOHOL-INDUCED MOOD DISORDER (HCC): Status: ACTIVE | Noted: 2021-05-24

## 2021-07-03 PROBLEM — F33.41 RECURRENT MAJOR DEPRESSION IN PARTIAL REMISSION (HCC): Status: ACTIVE | Noted: 2021-05-24

## 2021-07-03 PROBLEM — Z91.81 AT HIGH RISK FOR INJURY RELATED TO FALL: Status: ACTIVE | Noted: 2021-05-24

## 2021-07-03 PROBLEM — E87.6 HYPOKALEMIA: Chronic | Status: ACTIVE | Noted: 2021-07-03

## 2021-07-03 PROBLEM — I10 ESSENTIAL HYPERTENSION: Status: ACTIVE | Noted: 2021-05-24
